# Patient Record
Sex: FEMALE | Race: WHITE | NOT HISPANIC OR LATINO | Employment: FULL TIME | URBAN - METROPOLITAN AREA
[De-identification: names, ages, dates, MRNs, and addresses within clinical notes are randomized per-mention and may not be internally consistent; named-entity substitution may affect disease eponyms.]

---

## 2019-08-13 PROBLEM — M54.10 RADICULOPATHY: Status: ACTIVE | Noted: 2019-04-18

## 2019-08-27 PROBLEM — M47.26 OTHER SPONDYLOSIS WITH RADICULOPATHY, LUMBAR REGION: Status: ACTIVE | Noted: 2019-08-27

## 2019-12-26 PROBLEM — M54.2 NECK PAIN: Status: ACTIVE | Noted: 2019-04-01

## 2022-08-31 ENCOUNTER — TELEPHONE (OUTPATIENT)
Dept: PAIN MEDICINE | Facility: CLINIC | Age: 42
End: 2022-08-31

## 2022-08-31 NOTE — TELEPHONE ENCOUNTER
Provider: GUANACO HAUSER    Caller: January NADIANALRACHEL    Relationship to Patient: SELF    Phone Number: [phone]    Reason for Call: PATIENT STATES SHE IS EXPERIENCING UNSETTLING SOUNDS WHEN PUSHING ON INJECTION SITE AREA FROM VISIT ON 08/29/2022  When was the patient last seen: 08/29/2022  When did it start: 08/30/2022    HUB ATTEMPTED A WARM TRANSFER

## 2022-08-31 NOTE — TELEPHONE ENCOUNTER
PATIENT CALLED BACK, HUB PASSED ON THE MESSAGE FROM DR HAUSER. PATIENT HAD NO FURTHER QUESTIONS BUT WAS TOLD TO FOLLOW BACK UP WITH ANOTHER CALL TO THE OFFICE IF THE ISSUE PERSISTS MORE THAN A DAY OR TWO.

## 2023-07-25 ENCOUNTER — OFFICE VISIT (OUTPATIENT)
Dept: ENDOCRINOLOGY | Facility: CLINIC | Age: 43
End: 2023-07-25
Payer: COMMERCIAL

## 2023-07-25 VITALS
DIASTOLIC BLOOD PRESSURE: 80 MMHG | OXYGEN SATURATION: 100 % | HEIGHT: 70 IN | HEART RATE: 88 BPM | BODY MASS INDEX: 19.21 KG/M2 | WEIGHT: 134.2 LBS | SYSTOLIC BLOOD PRESSURE: 110 MMHG

## 2023-07-25 DIAGNOSIS — E16.2 HYPOGLYCEMIA: Primary | ICD-10-CM

## 2023-07-25 DIAGNOSIS — E27.40 ADRENAL INSUFFICIENCY: ICD-10-CM

## 2023-07-25 LAB — GLUCOSE BLDC GLUCOMTR-MCNC: 84 MG/DL (ref 70–105)

## 2023-07-25 PROCEDURE — 99203 OFFICE O/P NEW LOW 30 MIN: CPT | Performed by: INTERNAL MEDICINE

## 2023-07-25 PROCEDURE — 82948 REAGENT STRIP/BLOOD GLUCOSE: CPT | Performed by: INTERNAL MEDICINE

## 2023-07-25 NOTE — PATIENT INSTRUCTIONS
Please,    - Please get blood work done in fasting state (you can drink water but nothing otherwise after midnight)  - Blood work has to be done early in the morning between 7 and 8:00 AM.  No later than 9 AM.    Thank you for your visit today.    If you have any questions or concerns please feel free to reach out of the office.    We will try to reach out to your primary care to discuss your visit but if there is no significant finding on the blood but you can follow-up with your primary care.  If there is any significant finding we will plan for follow-up.

## 2023-07-25 NOTE — PROGRESS NOTES
-----------------------------------------------------------------  ENDOCRINE CLINIC NOTE  -----------------------------------------------------------------        PATIENT NAME: Brianna Bach  PATIENT : 1980 AGE: 43 y.o.  MRN NUMBER: [id number]  PRIMARY CARE: Winsome Diaz APRN    ==========================================================================    CHIEF COMPLAINT: Concerns for hypoglycemia as possible cause for lightheadedness  DATE OF SERVICE: 2023         ==========================================================================    HPI / SUBJECTIVE    43 y.o. female seen in the clinic today for evaluation of hypoglycemia as a possible cause for her lightheadedness.  Actively denied fever, chills, headache, chest pain, SOB, cough, nausea, vomiting, abdominal pain, diarrhea and focal weakness / numbness.  Patient reports that for last 2 months she has been experiencing episodes of lightheadedness/brain fog.  His episodes typically happen twice a day.  There has not been recent change into patient's dietary habit which she had stopped taking any simple sugar and using Glucerna once a day in the morning.  These episodes typically happen in the afternoon until she either do something physically active or have something to eat.  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  Denies any hyperpigmentation.  Denies any family history of adrenal disorder.  Family history of diabetes otherwise denied any other problems.  Patient was recently evaluated by primary care including rheumatological work-up and thyroid work-up which was all within normal limits.    ==========================================================================    REVIEW OF SYSTEMS    Constitutional:   Denies fever or chills or tiredness  Eyes: Denies change in vision  HEENT:  Denies headache, sore throat or nasal congestion  Cardiovascular:  Denies chest pain, palpitation or shortness of breath  Respiratory:  Denies cough, shortness of breath or sputum  Gastrointestinal:  Denies abdominal pain, nausea, vomiting, diarrhea or constipation  Genitourinary:  Denies dysuria or polyuria or hematuria  Musculoskeletal:  Denies any active muscle pain or bone pain  Neurologic:  Denies any focal weakness or numbness  Endocrine:  Please see HPI  Skin:  Denies any rash or skin breakdown    ==========================================================================                                                PAST MEDICAL HISTORY    Past Medical History:   Diagnosis Date    Arthralgia     B12 deficiency 02/21/2020    Facial paresthesia 04/01/2019    Leg pain     Neck pain     Palpitations     PCOS (polycystic ovarian syndrome)     Radiculopathy 04/18/2019    Radiculopathy     Shoulder pain, bilateral     Status post cervical spinal arthrodesis 04/18/2019       ==========================================================================    PAST SURGICAL HISTORY    Past Surgical History:   Procedure Laterality Date    CARPAL TUNNEL RELEASE      HERNIA REPAIR         ==========================================================================    FAMILY HISTORY    Family History   Problem Relation Age of Onset    Osteoporosis Mother     COPD Mother     Cancer Father     Heart disease Maternal Grandmother     Cancer Maternal Grandfather     Heart disease Paternal Grandmother     Cancer Paternal Grandfather        ==========================================================================    SOCIAL HISTORY    Social History     Socioeconomic History    Marital status:      Spouse name: Levi    Number of children: 1    Years of education: 12   Tobacco Use    Smoking status: Every Day     Packs/day: 0.00     Years: 20.00     Pack  years: 0.00     Types: Electronic Cigarette, Cigarettes     Start date: 1/30/2000    Smokeless tobacco: Never    Tobacco comments:     Smoked tobacco up until 3 years ago started vaping   Vaping Use    Vaping Use: Every day    Substances: Nicotine   Substance and Sexual Activity    Alcohol use: No    Drug use: Not Currently     Types: Hydrocodone, Oxycodone     Comment: Been clean for 3 years    Sexual activity: Not Currently     Partners: Female, Male     Birth control/protection: I.U.D.       ==========================================================================    MEDICATIONS      Current Outpatient Medications:     ARIPiprazole (ABILIFY) 10 MG tablet, Take 1 tablet by mouth Daily., Disp: , Rfl:     baclofen (LIORESAL) 20 MG tablet, Take 1 tablet by mouth 3 (Three) Times a Day., Disp: , Rfl:     buprenorphine-naloxone (SUBOXONE) 8-2 MG per SL tablet, DISSOLVE 1 T UNT QD, Disp: , Rfl:     buPROPion XL (WELLBUTRIN XL) 150 MG 24 hr tablet, Take 1 tablet by mouth Daily., Disp: , Rfl:     gabapentin (NEURONTIN) 800 MG tablet, Take 1 tablet by mouth 3 (Three) Times a Day., Disp: , Rfl:     meloxicam (MOBIC) 15 MG tablet, Take 1 tablet by mouth Daily As Needed for Moderate Pain for up to 90 days., Disp: 30 tablet, Rfl: 2    prazosin (MINIPRESS) 2 MG capsule, Take 1 capsule by mouth every night at bedtime., Disp: , Rfl:     Qelbree 200 MG capsule sustained-release 24 hr, Take 1 capsule by mouth Daily., Disp: , Rfl:     Lumateperone Tosylate (CAPLYTA PO), Take  by mouth Daily., Disp: , Rfl:     QUEtiapine (SEROquel) 100 MG tablet, Take 1 tablet by mouth 2 (Two) Times a Day., Disp: , Rfl:     Vyvanse 20 MG capsule, Take 1 capsule by mouth Every Morning, Disp: , Rfl:     Current Facility-Administered Medications:     cyanocobalamin injection 1,000 mcg, 1,000 mcg, Intramuscular, Q28 Days, Anisa Klein MD, 1,000 mcg at 08/20/21  1514    ==========================================================================    ALLERGIES    Allergies   Allergen Reactions    Duloxetine Other (See Comments)     agitation       ==========================================================================    OBJECTIVE    Vitals:    07/25/23 1506   BP: 110/80   Pulse: 88   SpO2: 100%     Body mass index is 19.26 kg/m².     General: Alert, cooperative, no acute distress  Thyroid:  no enlargement/tenderness/palpable nodules  Lungs: Clear to auscultation bilaterally, respirations unlabored  Heart: Regular rate and rhythm, S1 and S2 normal, no murmur, rub or gallop  Abdomen: Soft, NT, ND and Bowel sounds Positive  Extremities:  Extremities normal, atraumatic, no cyanosis or edema    ==========================================================================    LAB EVALUATION    Lab Results   Component Value Date    GLUCOSE 123 (H) 04/24/2023    BUN 14 04/24/2023    CREATININE 0.84 04/24/2023    EGFRIFNONA 72 07/20/2021    BCR 16.7 04/24/2023    K 4.1 04/24/2023    CO2 25.0 04/24/2023    CALCIUM 9.2 04/24/2023    ALBUMIN 4.4 04/16/2023    AST 18 04/24/2023    ALT 11 04/16/2023    CHOL 132 05/26/2021    TRIG 38 05/26/2021    HDL 53 05/26/2021    LDL 69 05/26/2021     No results found for: HGBA1C  Lab Results   Component Value Date    CREATININE 0.84 04/24/2023     Lab Results   Component Value Date    TSH 1.600 04/24/2023    FREET4 1.24 04/24/2023    THYROIDAB 14 04/24/2023       ==========================================================================    ASSESSMENT AND PLAN    Assessment:  #Hypoglycemia as a possible cause for lightheadedness  #Evaluation for possible underlying adrenal insufficiency    Plan:  - Clinically patient's episodes are less likely secondary to hypo glycemia or secondary to adrenal insufficiency  - There is no blood work evidence of hypoglycemia at this time but will get fasting blood work done including BMP to look for any evidence of  "hypoglycemia  - If there is evidence of hypoglycemia will further work-up  - Otherwise thyroid functions are within acceptable limit and A1c was also within acceptable limit on blood work on 5/16/2023 (5.4%)  - We will also order serum cortisol level to be done first thing in the morning before 8 AM and evaluate the results for concerns of adrenal insufficiency  - If all the blood work are within acceptable limit patient can continue to follow-up with primary care and stable from endocrinology team standpoint.  - We will also discuss care with primary care possibly over the phone and see if there is any other concerns to address for this patient.    Thank you for courtesy of consultation.    Return to clinic: as prn    Entire assessment and plan was discussed and counseled the patient in detail to which patient verbalized understanding and agreed with care.  Answered all queries and concerns.    This note was created using voice recognition software and is inherently subject to errors including those of syntax and \"sound-alike\" substitutions which may escape proofreading.  In such instances, original meaning may be extrapolated by contextual derivation.    ==========================================================================    INFORMATION PROVIDED TO PATIENT    Patient Instructions   Please,    - Please get blood work done in fasting state (you can drink water but nothing otherwise after midnight)  - Blood work has to be done early in the morning between 7 and 8:00 AM.  No later than 9 AM.    Thank you for your visit today.    If you have any questions or concerns please feel free to reach out of the office.    We will try to reach out to your primary care to discuss your visit but if there is no significant finding on the blood but you can follow-up with your primary care.  If there is any significant finding we will plan for " follow-up.        ==========================================================================  Jules Morales MD  Department of Endocrine, Diabetes and Metabolism  [facility], [place]  ==========================================================================

## 2023-07-28 ENCOUNTER — TELEPHONE (OUTPATIENT)
Dept: ENDOCRINOLOGY | Facility: CLINIC | Age: 43
End: 2023-07-28
Payer: COMMERCIAL

## 2023-07-28 NOTE — TELEPHONE ENCOUNTER
Caller: Brianna Bach    Relationship to patient: Self    Best call back number: [phone]    Patient is needing: NEEDS BLOOD WORK DONE CAN ONLY COME 8/7/23 BETWEEN 7AM-8AM  PATIENT TAKES LUNCH BETWEEN 1:30PM. AND 2:00PM SAID SHE CAN CALL THE OFFICE BACK THEN TO SCHEDULE THE APPOINTMENT IF SHE DOESN'T GEAR BACK BEFORE THEN OK TO [facility] CEJA

## 2023-08-07 ENCOUNTER — LAB (OUTPATIENT)
Dept: LAB | Facility: HOSPITAL | Age: 43
End: 2023-08-07
Payer: COMMERCIAL

## 2023-08-07 DIAGNOSIS — E27.40 ADRENAL INSUFFICIENCY: ICD-10-CM

## 2023-08-07 DIAGNOSIS — E16.2 HYPOGLYCEMIA: ICD-10-CM

## 2023-08-07 LAB
ANION GAP SERPL CALCULATED.3IONS-SCNC: 7.4 MMOL/L (ref 5–15)
BUN SERPL-MCNC: 19 MG/DL (ref 6–20)
BUN/CREAT SERPL: 18.1 (ref 7–25)
CALCIUM SPEC-SCNC: 9.1 MG/DL (ref 8.6–10.5)
CHLORIDE SERPL-SCNC: 108 MMOL/L (ref 98–107)
CO2 SERPL-SCNC: 26.6 MMOL/L (ref 22–29)
CORTIS SERPL-MCNC: 6.54 MCG/DL
CREAT SERPL-MCNC: 1.05 MG/DL (ref 0.57–1)
EGFRCR SERPLBLD CKD-EPI 2021: 68.6 ML/MIN/1.73
GLUCOSE SERPL-MCNC: 95 MG/DL (ref 65–99)
POTASSIUM SERPL-SCNC: 4.8 MMOL/L (ref 3.5–5.2)
SODIUM SERPL-SCNC: 142 MMOL/L (ref 136–145)

## 2023-08-07 PROCEDURE — 82533 TOTAL CORTISOL: CPT

## 2023-08-07 PROCEDURE — 36415 COLL VENOUS BLD VENIPUNCTURE: CPT

## 2023-08-07 PROCEDURE — 80048 BASIC METABOLIC PNL TOTAL CA: CPT

## 2023-08-13 DIAGNOSIS — M48.02 FORAMINAL STENOSIS OF CERVICAL REGION: ICD-10-CM

## 2023-08-13 DIAGNOSIS — M50.30 DDD (DEGENERATIVE DISC DISEASE), CERVICAL: ICD-10-CM

## 2023-08-13 DIAGNOSIS — M53.3 SACROILIAC JOINT DYSFUNCTION: ICD-10-CM

## 2023-08-14 RX ORDER — MELOXICAM 15 MG/1
TABLET ORAL
Qty: 30 TABLET | Refills: 2 | OUTPATIENT
Start: 2023-08-14

## 2024-01-22 ENCOUNTER — TRANSCRIBE ORDERS (OUTPATIENT)
Dept: ADMINISTRATIVE | Facility: HOSPITAL | Age: 44
End: 2024-01-22
Payer: COMMERCIAL

## 2024-01-22 DIAGNOSIS — Z12.31 SCREENING MAMMOGRAM, ENCOUNTER FOR: Primary | ICD-10-CM

## 2024-02-05 ENCOUNTER — HOSPITAL ENCOUNTER (OUTPATIENT)
Dept: MAMMOGRAPHY | Facility: HOSPITAL | Age: 44
Discharge: HOME | End: 2024-02-05
Admitting: NURSE PRACTITIONER
Payer: COMMERCIAL

## 2024-02-05 DIAGNOSIS — Z12.31 SCREENING MAMMOGRAM, ENCOUNTER FOR: ICD-10-CM

## 2024-02-05 PROCEDURE — 77067 SCR MAMMO BI INCL CAD: CPT

## 2024-02-05 PROCEDURE — 77063 BREAST TOMOSYNTHESIS BI: CPT

## 2024-02-22 ENCOUNTER — TRANSCRIBE ORDERS (OUTPATIENT)
Dept: ADMINISTRATIVE | Facility: HOSPITAL | Age: 44
End: 2024-02-22
Payer: COMMERCIAL

## 2024-02-22 DIAGNOSIS — R92.8 ABNORMAL MAMMOGRAM OF LEFT BREAST: Primary | ICD-10-CM

## 2024-02-26 ENCOUNTER — HOSPITAL ENCOUNTER (OUTPATIENT)
Dept: MAMMOGRAPHY | Facility: HOSPITAL | Age: 44
Discharge: HOME | End: 2024-02-26
Payer: COMMERCIAL

## 2024-02-26 ENCOUNTER — HOSPITAL ENCOUNTER (OUTPATIENT)
Dept: ULTRASOUND IMAGING | Facility: HOSPITAL | Age: 44
Discharge: HOME | End: 2024-02-26
Payer: COMMERCIAL

## 2024-03-04 ENCOUNTER — HOSPITAL ENCOUNTER (OUTPATIENT)
Dept: ULTRASOUND IMAGING | Facility: HOSPITAL | Age: 44
Discharge: HOME | End: 2024-03-04
Payer: COMMERCIAL

## 2024-03-04 ENCOUNTER — HOSPITAL ENCOUNTER (OUTPATIENT)
Dept: MAMMOGRAPHY | Facility: HOSPITAL | Age: 44
Discharge: HOME | End: 2024-03-04
Payer: COMMERCIAL

## 2024-03-04 DIAGNOSIS — R92.8 ABNORMAL MAMMOGRAM OF LEFT BREAST: ICD-10-CM

## 2024-03-04 PROCEDURE — 77065 DX MAMMO INCL CAD UNI: CPT

## 2024-03-04 PROCEDURE — 76642 ULTRASOUND BREAST LIMITED: CPT

## 2024-03-04 PROCEDURE — G0279 TOMOSYNTHESIS, MAMMO: HCPCS

## 2024-10-28 ENCOUNTER — OFFICE (OUTPATIENT)
Dept: URBAN - METROPOLITAN AREA CLINIC 64 | Facility: CLINIC | Age: 44
End: 2024-10-28
Payer: COMMERCIAL

## 2024-10-28 ENCOUNTER — OFFICE (AMBULATORY)
Age: 44
End: 2024-10-28

## 2024-10-28 VITALS
DIASTOLIC BLOOD PRESSURE: 58 MMHG | HEIGHT: 70 IN | HEART RATE: 66 BPM | WEIGHT: 132 LBS | HEART RATE: 66 BPM | SYSTOLIC BLOOD PRESSURE: 94 MMHG | DIASTOLIC BLOOD PRESSURE: 58 MMHG | SYSTOLIC BLOOD PRESSURE: 94 MMHG | HEIGHT: 70 IN | WEIGHT: 132 LBS

## 2024-10-28 DIAGNOSIS — Z12.11 ENCOUNTER FOR SCREENING FOR MALIGNANT NEOPLASM OF COLON: ICD-10-CM

## 2024-10-28 DIAGNOSIS — K59.03 DRUG INDUCED CONSTIPATION: ICD-10-CM

## 2024-10-28 PROCEDURE — 99204 OFFICE O/P NEW MOD 45 MIN: CPT

## 2024-10-28 RX ORDER — METHYLNALTREXONE BROMIDE 150 MG/1
TABLET ORAL
Qty: 90 | Refills: 3 | Status: ACTIVE
Start: 2024-10-28

## 2024-10-28 RX ORDER — METHYLNALTREXONE BROMIDE 150 MG/1
TABLET ORAL
Qty: 90 | Refills: 3 | Status: COMPLETED
Start: 2024-10-28 | End: 2024-12-12

## 2024-12-12 ENCOUNTER — ON CAMPUS - OUTPATIENT (AMBULATORY)
Dept: URBAN - METROPOLITAN AREA HOSPITAL 2 | Facility: HOSPITAL | Age: 44
End: 2024-12-12
Payer: COMMERCIAL

## 2024-12-12 VITALS
DIASTOLIC BLOOD PRESSURE: 43 MMHG | DIASTOLIC BLOOD PRESSURE: 44 MMHG | OXYGEN SATURATION: 97 % | SYSTOLIC BLOOD PRESSURE: 100 MMHG | WEIGHT: 130 LBS | OXYGEN SATURATION: 100 % | DIASTOLIC BLOOD PRESSURE: 74 MMHG | HEART RATE: 61 BPM | SYSTOLIC BLOOD PRESSURE: 115 MMHG | DIASTOLIC BLOOD PRESSURE: 48 MMHG | DIASTOLIC BLOOD PRESSURE: 60 MMHG | SYSTOLIC BLOOD PRESSURE: 85 MMHG | RESPIRATION RATE: 18 BRPM | DIASTOLIC BLOOD PRESSURE: 63 MMHG | DIASTOLIC BLOOD PRESSURE: 72 MMHG | HEART RATE: 89 BPM | SYSTOLIC BLOOD PRESSURE: 80 MMHG | SYSTOLIC BLOOD PRESSURE: 82 MMHG | OXYGEN SATURATION: 99 % | HEART RATE: 58 BPM | OXYGEN SATURATION: 98 % | SYSTOLIC BLOOD PRESSURE: 95 MMHG | SYSTOLIC BLOOD PRESSURE: 87 MMHG | HEART RATE: 73 BPM | TEMPERATURE: 98.9 F | SYSTOLIC BLOOD PRESSURE: 81 MMHG | SYSTOLIC BLOOD PRESSURE: 135 MMHG | HEART RATE: 62 BPM | HEART RATE: 60 BPM | DIASTOLIC BLOOD PRESSURE: 51 MMHG | HEART RATE: 69 BPM | DIASTOLIC BLOOD PRESSURE: 46 MMHG | SYSTOLIC BLOOD PRESSURE: 111 MMHG | HEIGHT: 70 IN

## 2024-12-12 DIAGNOSIS — Z12.11 ENCOUNTER FOR SCREENING FOR MALIGNANT NEOPLASM OF COLON: ICD-10-CM

## 2024-12-12 DIAGNOSIS — Z83.719 FAMILY HISTORY OF COLON POLYPS, UNSPECIFIED: ICD-10-CM

## 2024-12-12 PROBLEM — Z83.71: Status: ACTIVE | Noted: 2024-12-12

## 2024-12-12 PROCEDURE — G0105 COLORECTAL SCRN; HI RISK IND: HCPCS | Performed by: INTERNAL MEDICINE

## 2024-12-12 RX ADMIN — ONDANSETRON HYDROCHLORIDE 4 MG: 4 SOLUTION ORAL at 10:37

## 2025-04-24 NOTE — PROGRESS NOTES
Subjective   January Rosibel Bach is a 45 y.o. female for neck and lower back pain.  Patient was last seen about 2 years ago and no follow-up since then. She states that Gabapentin is causing mental fogginess and causing withdrawal if she is not taking it every 8 hrs.       On last visit:       Neck pain is 5/10 on VAS, at maximum 6/10.  Numbness in nature.  Refer intermittent to right triceps and trapezius. Right trapezius. Much improved with DENISE and gabapentin.  Intermittent pain.  + Headache- radiating from bilateral occipital to top of her head-improved by occipital nerve blocks previously.    Lower back/buttock pain is 3/10 on VAS, maximum 7/10.  Pain is sharp, stabbing and shooting in nature.  Not referred.  Constant pain.  Improved by nothing.  Worse with sitting for long time.    Previous Injection:   4/6/2023-bilateral SI joint injections- 40% pain relief.   1/13/23- DENISE C7-T1 - 70-80% pain relief. Functional improvement  8/29/2022-B/L occipital nerve block- excellent relief with migraine.  5/19/2022-DENISE C7-T1- 80% pain relief; only 1 episode of shooting pain to arm.      Hx-referred by Blayne Tidwell MD  to our pain management clinic for consultation, evaluation and treatment of neck pain and possible cervical epidural.  Her neck pain started about 2 years ago in 2020 without any significant injuries but has been getting worse since then. Her arm pain and numbness only started recently around 1/22. She is scheduled to see Rheumatologist for +SHUKRI in 3/22. She had b/l carpal tunnel injections 8 weeks  Klutza and Kliener without much improvement.           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 Marquez)  Gabapentin 800 mg  TID  Seroquel        Past Medications:     Past Modalities:  TENS:                                                                          no                                                  Physical Therapy Within The Last 6 Months              Yes (8 weeks of PT - [facility] - finished 4/22; home exercises since 12/23 - >6 weeks; [facility]-5/2023)  Psychotherapy                                                            no  Massage Therapy                                                       no     Patient Complains Of:  Uro-Fecal Incontinence          no  Weight Gain/Loss                   no  Fever/Chills                             no  Weakness                               no        Current Outpatient Medications:     ARIPiprazole (ABILIFY) 10 MG tablet, Take 1 tablet by mouth Daily., Disp: , Rfl:     baclofen (LIORESAL) 20 MG tablet, Take 1 tablet by mouth 3 (Three) Times a Day., Disp: , Rfl:     buprenorphine-naloxone (SUBOXONE) 8-2 MG per SL tablet, DISSOLVE 1 T UNT QD, Disp: , Rfl:     buPROPion XL (WELLBUTRIN XL) 150 MG 24 hr tablet, Take 1 tablet by mouth Daily., Disp: , Rfl:     prazosin (MINIPRESS) 2 MG capsule, Take 1 capsule by mouth every night at bedtime., Disp: , Rfl:     Qelbree 200 MG capsule sustained-release 24 hr, Take 1 capsule by mouth Daily., Disp: , Rfl:     meloxicam (MOBIC) 15 MG tablet, Take 1 tablet by mouth Daily As Needed for Moderate Pain for up to 90 days., Disp: 30 tablet, Rfl: 2    pregabalin (Lyrica) 200 MG capsule, Take 1 capsule by mouth 2 (Two) Times a Day for 60 days., Disp: 60 capsule, Rfl: 1    Current Facility-Administered Medications:     cyanocobalamin injection 1,000 mcg, 1,000 mcg, Intramuscular, Q28 Days, Anisa Klein MD, 1,000 mcg at 08/20/21 1514    The following portions of the patient's history were reviewed and updated as appropriate: allergies, current medications, past family history, past medical history, past social history, past  surgical history, and problem list.      REVIEW OF PERTINENT MEDICAL DATA    Past Medical History:   Diagnosis Date    Arthralgia     B12 deficiency 02/21/2020    Facial paresthesia 04/01/2019    Leg pain     Neck pain     Palpitations     PCOS (polycystic ovarian syndrome)     Radiculopathy 04/18/2019    Radiculopathy     Shoulder pain, bilateral     Status post cervical spinal arthrodesis 04/18/2019     Past Surgical History:   Procedure Laterality Date    CARPAL TUNNEL RELEASE      HERNIA REPAIR       Family History   Problem Relation Age of Onset    Osteoporosis Mother     COPD Mother     Cancer Father     Heart disease Maternal Grandmother     Cancer Maternal Grandfather     Heart disease Paternal Grandmother     Cancer Paternal Grandfather     Diabetes Maternal Aunt      Social History     Socioeconomic History    Marital status:      Spouse name: Levi    Number of children: 1    Years of education: 12   Tobacco Use    Smoking status: Some Days     Types: Electronic Cigarette, Cigarettes     Start date: 1/30/2000    Smokeless tobacco: Never    Tobacco comments:     Smoked tobacco up until 3 years ago started vaping   Vaping Use    Vaping status: Every Day    Substances: Nicotine   Substance and Sexual Activity    Alcohol use: No    Drug use: Not Currently     Types: Hydrocodone, Oxycodone     Comment: Been clean for 3 years    Sexual activity: Not Currently     Partners: Female, Male     Birth control/protection: I.U.D.         Review of Systems   Musculoskeletal:  Positive for arthralgias, back pain, neck pain and neck stiffness.         Vitals:    04/28/25 0846   BP: 106/65   Pulse: 74   Resp: 16   SpO2: 99%   Weight: 61.7 kg (136 lb)   PainSc: 5            Objective   Physical Exam  HENT:      Head:     Musculoskeletal:         General: Tenderness present.        Arms:         Legs:    Neurological:      Deep Tendon Reflexes:      Reflex Scores:       Tricep reflexes are 2+ on the right side and  2+ on the left side.       Bicep reflexes are 2+ on the right side and 2+ on the left side.       Brachioradialis reflexes are 2+ on the right side and 2+ on the left side.     Comments: Motor strength 5/5 b/l LE  Sensory intact b/l LE             Imaging Reviewed:    MRI lumbar spine-4/16/2023  - L4-5-small disc bulge.  No spinal canal stenosis.  Small focal area of increased T2 signal in the central aspect of disc consistent with small effusion.  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  - C4-5-disc bulge.  Mild right neural foraminal narrowing  C5-C6-diffuse disc bulge asymmetric to the left.  Disc osteophyte with spurring causing mild to moderate central spinal stenosis to 9 mm as well as moderate to severe left foraminal narrowing    MRI thoracic spine-4/16/2023  - Small focal protrusion at T7-T8 and anterior osteophyte formation at T9-10.  Otherwise no abnormalities.    Imaging Reviewed:  EMG lower extremities-WNL.     MRI cervical spine-1/25/2022  - C4-5-small diffuse posterior disc bulge.  No herniation or canal stenosis.  - C5-6-severe left foraminal narrowing due to disc osteophyte complex and disc protrusion and left foramina.  Diffuse posterior disc bulge causing mild to moderate central canal stenosis.  AP diameter is now 9 mm.  Previously was 10.  Mild right foraminal narrowing.  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          Assessment:    1. Foraminal stenosis of cervical region    2. Sacroiliac joint dysfunction    3. Cervical radiculopathy           Plan:   1. UDS deferred.  Currently on suboxone. Previous drug abuse-not a good candidate for opioids.  2. We discussed trying a course of formal physical therapy.  Physical therapy can help strengthen and stretch the muscles around the joints. Continue to be as active as possible. She has done PT for 8 weeks with short term relief.   3.Good relief with DENISE, discussed possibility of repeating, but she  would like to hold off for now.    4.  Continue Mobic 15 mg daily PRN Non-steroidal anti-inflammatory drugs (NSAIDs) may cause an increased risk of serious cardiovascular thrombotic events, myocardial infarction, and stroke, along with increased risk of serious gastrointestinal adverse events including bleeding, ulceration, and perforation of the stomach or intestines, which can be fatal.  5. STOP gabapentin due to side effects. Start Lyrica 200 mg BID. Side effects discussed with the patient including but not limited to dizziness, blurry vision, weight gain, sleepiness, trouble concentrating, swelling of your hands and feet, dry mouth, feeling high and suicidal thoughts. Patient understands and agrees with the plan.      RTC in 2 months.      Dung Hodges DO  Pain Management   [facility]         INSPECT REPORT    As part of the patient's treatment plan, I may be prescribing controlled substances. The patient has been made aware of appropriate use of such medications, including potential risk of somnolence, limited ability to drive and/or work safely, and the potential for dependence or overdose. It has also been made clear that these medications are for use by this patient only, without concomitant use of alcohol or other substances unless prescribed.     Patient has completed prescribing agreement detailing terms of continued prescribing of controlled substances, including monitoring INSPECT reports, urine drug screening, and pill counts if necessary. The patient is aware that inappropriate use will results in cessation of prescribing such medications.    INSPECT report has been reviewed and scanned into the patient's chart.

## 2025-04-28 ENCOUNTER — OFFICE (AMBULATORY)
Dept: URBAN - METROPOLITAN AREA CLINIC 64 | Facility: CLINIC | Age: 45
End: 2025-04-28

## 2025-04-28 ENCOUNTER — OFFICE VISIT (OUTPATIENT)
Dept: PAIN MEDICINE | Facility: CLINIC | Age: 45
End: 2025-04-28
Payer: COMMERCIAL

## 2025-04-28 VITALS
WEIGHT: 136 LBS | SYSTOLIC BLOOD PRESSURE: 106 MMHG | RESPIRATION RATE: 16 BRPM | DIASTOLIC BLOOD PRESSURE: 65 MMHG | BODY MASS INDEX: 19.51 KG/M2 | OXYGEN SATURATION: 99 % | HEART RATE: 74 BPM

## 2025-04-28 DIAGNOSIS — M48.02 FORAMINAL STENOSIS OF CERVICAL REGION: Primary | ICD-10-CM

## 2025-04-28 DIAGNOSIS — M53.3 SACROILIAC JOINT DYSFUNCTION: ICD-10-CM

## 2025-04-28 DIAGNOSIS — M54.12 CERVICAL RADICULOPATHY: ICD-10-CM

## 2025-04-28 PROCEDURE — 99214 OFFICE O/P EST MOD 30 MIN: CPT | Performed by: STUDENT IN AN ORGANIZED HEALTH CARE EDUCATION/TRAINING PROGRAM

## 2025-04-28 RX ORDER — PREGABALIN 200 MG/1
200 CAPSULE ORAL 2 TIMES DAILY
Qty: 60 CAPSULE | Refills: 1 | Status: SHIPPED | OUTPATIENT
Start: 2025-04-28 | End: 2025-06-27

## 2025-04-28 RX ORDER — MELOXICAM 15 MG/1
15 TABLET ORAL DAILY PRN
Qty: 30 TABLET | Refills: 2 | Status: SHIPPED | OUTPATIENT
Start: 2025-04-28 | End: 2025-07-27

## 2025-06-24 NOTE — PROGRESS NOTES
Subjective   January Rosibel Bach is a 45 y.o. female for neck and lower back pain.  Patient was last seen 2 months ago.. She stats that her neck pain is somewhat increased due to she is trying to wean her self off of Suboxone.       On last visit: Started Lyrica- helps, but still has some pain.       Neck pain is 5/10 on VAS, at maximum 6/10.  Numbness in nature.  Refer intermittent to right triceps and trapezius. Right trapezius. Much improved with DENISE and gabapentin.  Intermittent pain.  + Headache- radiating from bilateral occipital to top of her head-improved by occipital nerve blocks previously.    Lower back/buttock pain is 3/10 on VAS, maximum 7/10.  Pain is sharp, stabbing and shooting in nature.  Not referred.  Constant pain.  Improved by nothing.  Worse with sitting for long time.    Previous Injection:   4/6/2023-bilateral SI joint injections- 40% pain relief.   1/13/23- DENISE C7-T1 - 70-80% pain relief. Functional improvement  8/29/2022-B/L occipital nerve block- excellent relief with migraine.  5/19/2022-DENISE C7-T1- 80% pain relief; only 1 episode of shooting pain to arm.      Hx-referred by , Blayne HUSTON MD  to our pain management clinic for consultation, evaluation and treatment of neck pain and possible cervical epidural.  Her neck pain started about 2 years ago in 2020 without any significant injuries but has been getting worse since then. Her arm pain and numbness only started recently around 1/22. She is scheduled to see Rheumatologist for +SHUKRI in 3/22. She had b/l carpal tunnel injections 8 weeks  Klutza and Kliener without much improvement.           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 Marquez)    Seroquel         Past Medications:   Gabapentin 800 mg TID-mental fogginess  Past Modalities:  TENS:                                                                          no                                                  Physical Therapy Within The Last 6 Months              Yes (8 weeks of PT - [email or web address] - finished 4/22; home exercises since 12/23 - >6 weeks; [facility]-5/2023)  Psychotherapy                                                            no  Massage Therapy                                                       no     Patient Complains Of:  Uro-Fecal Incontinence          no  Weight Gain/Loss                   no  Fever/Chills                             no  Weakness                               no        Current Outpatient Medications:     baclofen (LIORESAL) 20 MG tablet, Take 1 tablet by mouth 3 (Three) Times a Day., Disp: , Rfl:     buprenorphine-naloxone (SUBOXONE) 8-2 MG per SL tablet, DISSOLVE 1 T UNT QD, Disp: , Rfl:     lubiprostone (Amitiza) 24 MCG capsule, 60 capsules., Disp: , Rfl:     meloxicam (MOBIC) 15 MG tablet, Take 1 tablet by mouth Daily As Needed for Moderate Pain for up to 90 days., Disp: 30 tablet, Rfl: 2    ARIPiprazole (ABILIFY) 10 MG tablet, Take 1 tablet by mouth Daily., Disp: , Rfl:     buPROPion XL (WELLBUTRIN XL) 150 MG 24 hr tablet, Take 1 tablet by mouth Daily., Disp: , Rfl:     prazosin (MINIPRESS) 2 MG capsule, Take 1 capsule by mouth every night at bedtime., Disp: , Rfl:     pregabalin (Lyrica) 300 MG capsule, Take 1 capsule by mouth 2 (Two) Times a Day for 60 days., Disp: 60 capsule, Rfl: 1    Qelbree 200 MG capsule sustained-release 24 hr, Take 1 capsule by mouth Daily., Disp: , Rfl:     Current Facility-Administered Medications:     cyanocobalamin injection 1,000 mcg, 1,000 mcg, Intramuscular, Q28 Days, Anisa Klein MD, 1,000 mcg at 08/20/21 1514    The following portions of the patient's history were reviewed and updated as appropriate: allergies,  current medications, past family history, past medical history, past social history, past surgical history, and problem list.      REVIEW OF PERTINENT MEDICAL DATA    Past Medical History:   Diagnosis Date    Arthralgia     B12 deficiency 02/21/2020    Bipolar disorder 2021    Cervical disc disorder 2022    Facial paresthesia 04/01/2019    Headache, tension-type 2023    Leg pain     Migraine 2023    Neck pain     Palpitations     PCOS (polycystic ovarian syndrome)     Radiculopathy 04/18/2019    Radiculopathy     Shoulder pain, bilateral     Spinal stenosis 2022    Status post cervical spinal arthrodesis 04/18/2019     Past Surgical History:   Procedure Laterality Date    CARPAL TUNNEL RELEASE      HERNIA REPAIR       Family History   Problem Relation Age of Onset    Osteoporosis Mother     COPD Mother     Alcohol abuse Mother     Cancer Father     Alcohol abuse Father     Heart disease Maternal Grandmother     Cancer Maternal Grandfather     Heart disease Paternal Grandmother     Cancer Paternal Grandfather     Diabetes Maternal Aunt      Social History     Socioeconomic History    Marital status:      Spouse name: Levi    Number of children: 1    Years of education: 12   Tobacco Use    Smoking status: Some Days     Types: Electronic Cigarette, Cigarettes     Start date: 1/30/2019    Smokeless tobacco: Never    Tobacco comments:     Smoked tobacco up until 4 years ago started vaping   Vaping Use    Vaping status: Every Day    Substances: Nicotine   Substance and Sexual Activity    Alcohol use: No    Drug use: Not Currently     Types: Hydrocodone, Marijuana, Oxycodone     Comment: Been clean for 5 years    Sexual activity: Yes     Partners: Male     Birth control/protection: I.U.D.         Review of Systems   Musculoskeletal:  Positive for arthralgias, back pain, neck pain and neck stiffness.         Vitals:    06/26/25 1257   BP: 121/73   Pulse: 69   Resp: 16   SpO2: 100%   Weight: 64 kg (141 lb)    PainSc: 5              Objective   Physical Exam  HENT:      Head:     Musculoskeletal:         General: Tenderness present.        Arms:         Legs:    Neurological:      Deep Tendon Reflexes:      Reflex Scores:       Tricep reflexes are 2+ on the right side and 2+ on the left side.       Bicep reflexes are 2+ on the right side and 2+ on the left side.       Brachioradialis reflexes are 2+ on the right side and 2+ on the left side.     Comments: Motor strength 5/5 b/l LE  Sensory intact b/l LE             Imaging Reviewed:    MRI lumbar spine-4/16/2023  - L4-5-small disc bulge.  No spinal canal stenosis.  Small focal area of increased T2 signal in the central aspect of disc consistent with small effusion.  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  - C4-5-disc bulge.  Mild right neural foraminal narrowing  C5-C6-diffuse disc bulge asymmetric to the left.  Disc osteophyte with spurring causing mild to moderate central spinal stenosis to 9 mm as well as moderate to severe left foraminal narrowing    MRI thoracic spine-4/16/2023  - Small focal protrusion at T7-T8 and anterior osteophyte formation at T9-10.  Otherwise no abnormalities.    Imaging Reviewed:  EMG lower extremities-WNL.     MRI cervical spine-1/25/2022  - C4-5-small diffuse posterior disc bulge.  No herniation or canal stenosis.  - C5-6-severe left foraminal narrowing due to disc osteophyte complex and disc protrusion and left foramina.  Diffuse posterior disc bulge causing mild to moderate central canal stenosis.  AP diameter is now 9 mm.  Previously was 10.  Mild right foraminal narrowing.  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          Assessment:    1. Foraminal stenosis of cervical region    2. Sacroiliac joint dysfunction    3. Cervical radiculopathy             Plan:   1. UDS deferred.  Currently on suboxone. Previous drug abuse-not a good candidate for opioids.  2. We discussed trying a course of  formal physical therapy.  Physical therapy can help strengthen and stretch the muscles around the joints. Continue to be as active as possible. She has done PT for 8 weeks with short term relief.   3.Good relief with DENISE, discussed possibility of repeating, but she would like to hold off for now.    4.  Continue Mobic 15 mg daily PRN Non-steroidal anti-inflammatory drugs (NSAIDs) may cause an increased risk of serious cardiovascular thrombotic events, myocardial infarction, and stroke, along with increased risk of serious gastrointestinal adverse events including bleeding, ulceration, and perforation of the stomach or intestines, which can be fatal.  5.  Increase Lyrica 300 mg BID. Side effects discussed with the patient including but not limited to dizziness, blurry vision, weight gain, sleepiness, trouble concentrating, swelling of your hands and feet, dry mouth, feeling high and suicidal thoughts. Patient understands and agrees with the plan.  6. May consider repeating DENISE if Lyrica 300 mg doesn't provide significant pain relief.       RTC in 2 months.      Dung Hodges DO  Pain Management   [facility]         INSPECT REPORT    As part of the patient's treatment plan, I may be prescribing controlled substances. The patient has been made aware of appropriate use of such medications, including potential risk of somnolence, limited ability to drive and/or work safely, and the potential for dependence or overdose. It has also been made clear that these medications are for use by this patient only, without concomitant use of alcohol or other substances unless prescribed.     Patient has completed prescribing agreement detailing terms of continued prescribing of controlled substances, including monitoring INSPECT reports, urine drug screening, and pill counts if necessary. The patient is aware that inappropriate use will results in cessation of prescribing such medications.    INSPECT report has been reviewed  and scanned into the patient's chart.

## 2025-06-26 ENCOUNTER — OFFICE VISIT (OUTPATIENT)
Dept: PAIN MEDICINE | Facility: CLINIC | Age: 45
End: 2025-06-26
Payer: COMMERCIAL

## 2025-06-26 VITALS
HEART RATE: 69 BPM | SYSTOLIC BLOOD PRESSURE: 121 MMHG | WEIGHT: 141 LBS | BODY MASS INDEX: 20.23 KG/M2 | RESPIRATION RATE: 16 BRPM | DIASTOLIC BLOOD PRESSURE: 73 MMHG | OXYGEN SATURATION: 100 %

## 2025-06-26 DIAGNOSIS — M48.02 FORAMINAL STENOSIS OF CERVICAL REGION: Primary | ICD-10-CM

## 2025-06-26 DIAGNOSIS — M54.12 CERVICAL RADICULOPATHY: ICD-10-CM

## 2025-06-26 DIAGNOSIS — M53.3 SACROILIAC JOINT DYSFUNCTION: ICD-10-CM

## 2025-06-26 RX ORDER — PREGABALIN 300 MG/1
300 CAPSULE ORAL 2 TIMES DAILY
Qty: 60 CAPSULE | Refills: 1 | Status: SHIPPED | OUTPATIENT
Start: 2025-06-26 | End: 2025-08-25

## 2025-06-26 RX ORDER — MELOXICAM 15 MG/1
15 TABLET ORAL DAILY PRN
Qty: 30 TABLET | Refills: 2 | Status: SHIPPED | OUTPATIENT
Start: 2025-06-26 | End: 2025-09-24

## 2025-06-26 RX ORDER — LUBIPROSTONE 24 UG/1
60 CAPSULE ORAL
COMMUNITY
Start: 2025-01-27

## 2025-08-18 ENCOUNTER — OFFICE VISIT (OUTPATIENT)
Dept: PAIN MEDICINE | Facility: CLINIC | Age: 45
End: 2025-08-18
Payer: COMMERCIAL

## 2025-08-18 VITALS
WEIGHT: 145 LBS | SYSTOLIC BLOOD PRESSURE: 101 MMHG | DIASTOLIC BLOOD PRESSURE: 62 MMHG | BODY MASS INDEX: 20.81 KG/M2 | HEART RATE: 90 BPM | RESPIRATION RATE: 16 BRPM | OXYGEN SATURATION: 98 %

## 2025-08-18 DIAGNOSIS — M53.3 SACROILIAC JOINT DYSFUNCTION: ICD-10-CM

## 2025-08-18 DIAGNOSIS — M48.02 FORAMINAL STENOSIS OF CERVICAL REGION: ICD-10-CM

## 2025-08-18 DIAGNOSIS — M54.12 CERVICAL RADICULOPATHY: ICD-10-CM

## 2025-08-18 PROCEDURE — 99214 OFFICE O/P EST MOD 30 MIN: CPT | Performed by: STUDENT IN AN ORGANIZED HEALTH CARE EDUCATION/TRAINING PROGRAM

## 2025-08-18 RX ORDER — MELOXICAM 15 MG/1
15 TABLET ORAL DAILY PRN
Qty: 30 TABLET | Refills: 2 | Status: SHIPPED | OUTPATIENT
Start: 2025-08-18 | End: 2025-11-16

## 2025-08-18 RX ORDER — PREGABALIN 300 MG/1
300 CAPSULE ORAL 2 TIMES DAILY
Qty: 60 CAPSULE | Refills: 2 | Status: SHIPPED | OUTPATIENT
Start: 2025-08-18 | End: 2025-11-16